# Patient Record
Sex: FEMALE | Race: OTHER | HISPANIC OR LATINO | URBAN - METROPOLITAN AREA
[De-identification: names, ages, dates, MRNs, and addresses within clinical notes are randomized per-mention and may not be internally consistent; named-entity substitution may affect disease eponyms.]

---

## 2018-08-11 ENCOUNTER — EMERGENCY (EMERGENCY)
Facility: HOSPITAL | Age: 83
LOS: 1 days | Discharge: HOME | End: 2018-08-11
Attending: EMERGENCY MEDICINE | Admitting: EMERGENCY MEDICINE
Payer: SELF-PAY

## 2018-08-11 VITALS
HEART RATE: 77 BPM | SYSTOLIC BLOOD PRESSURE: 210 MMHG | OXYGEN SATURATION: 98 % | RESPIRATION RATE: 18 BRPM | TEMPERATURE: 98 F | DIASTOLIC BLOOD PRESSURE: 121 MMHG

## 2018-08-11 VITALS
DIASTOLIC BLOOD PRESSURE: 113 MMHG | HEART RATE: 75 BPM | SYSTOLIC BLOOD PRESSURE: 208 MMHG | RESPIRATION RATE: 16 BRPM | OXYGEN SATURATION: 98 %

## 2018-08-11 DIAGNOSIS — K56.2 VOLVULUS: Chronic | ICD-10-CM

## 2018-08-11 DIAGNOSIS — M25.532 PAIN IN LEFT WRIST: ICD-10-CM

## 2018-08-11 PROCEDURE — 99284 EMERGENCY DEPT VISIT MOD MDM: CPT | Mod: 25

## 2018-08-11 PROCEDURE — 99285 EMERGENCY DEPT VISIT HI MDM: CPT | Mod: 25

## 2018-08-11 PROCEDURE — 25605 CLTX DST RDL FX/EPHYS SEP W/: CPT | Mod: LT

## 2018-08-11 PROCEDURE — 73090 X-RAY EXAM OF FOREARM: CPT

## 2018-08-11 PROCEDURE — 73110 X-RAY EXAM OF WRIST: CPT | Mod: 26,LT

## 2018-08-11 PROCEDURE — 73090 X-RAY EXAM OF FOREARM: CPT | Mod: 26,LT

## 2018-08-11 PROCEDURE — 73110 X-RAY EXAM OF WRIST: CPT

## 2018-08-11 PROCEDURE — 25605 CLTX DST RDL FX/EPHYS SEP W/: CPT | Mod: 54

## 2018-08-11 RX ORDER — ACETAMINOPHEN 500 MG
975 TABLET ORAL ONCE
Qty: 0 | Refills: 0 | Status: COMPLETED | OUTPATIENT
Start: 2018-08-11 | End: 2018-08-11

## 2018-08-11 RX ORDER — LOSARTAN POTASSIUM 100 MG/1
1 TABLET, FILM COATED ORAL
Qty: 0 | Refills: 0 | COMMUNITY

## 2018-08-11 RX ORDER — LOSARTAN POTASSIUM 100 MG/1
25 TABLET, FILM COATED ORAL DAILY
Qty: 0 | Refills: 0 | Status: DISCONTINUED | OUTPATIENT
Start: 2018-08-11 | End: 2018-08-15

## 2018-08-11 RX ADMIN — LOSARTAN POTASSIUM 25 MILLIGRAM(S): 100 TABLET, FILM COATED ORAL at 16:53

## 2018-08-11 RX ADMIN — Medication 975 MILLIGRAM(S): at 16:52

## 2018-08-11 NOTE — ED ADULT NURSE NOTE - NSIMPLEMENTINTERV_GEN_ALL_ED
Implemented All Fall with Harm Risk Interventions:  [place] to call system. Call bell, personal items and telephone within reach. Instruct patient to call for assistance. Room bathroom lighting operational. Non-slip footwear when patient is off stretcher. Physically safe environment: no spills, clutter or unnecessary equipment. Stretcher in lowest position, wheels locked, appropriate side rails in place. Provide visual cue, wrist band, yellow gown, etc. Monitor gait and stability. Monitor for mental status changes and reorient to person, place, and time. Review medications for side effects contributing to fall risk. Reinforce activity limits and safety measures with patient and family. Provide visual clues: red socks.

## 2018-08-11 NOTE — ED PROVIDER NOTE - OBJECTIVE STATEMENT
pt s/p slip and fall (wet feet on smooth surface) with fall onto outstretched hand. Patient with c/o left wrist pain (she is right hand dominant). No head injury or LOC. C/O swelling and pain.

## 2018-08-11 NOTE — ED PROVIDER NOTE - MUSCULOSKELETAL MINIMAL EXAM
tender at the distal radius / ulna. +snuff box tenderness. Dista neurovasc intact with +cap refill./RANGE OF MOTION LIMITED

## 2018-08-11 NOTE — ED PROVIDER NOTE - PROGRESS NOTE DETAILS
d/w patietn that BP is elevated. No chest pain. No hematuria. Patient will f/u outpt ; has losartan at home. Pain control. Fx reduced. Given strict precautions for return.

## 2018-08-11 NOTE — ED ADULT NURSE NOTE - CHPI ED NUR SYMPTOMS NEG
no difficulty bearing weight/no abrasion/no weakness/no bruising/no numbness/no back pain/no stiffness/no fever/no tingling

## 2018-08-13 ENCOUNTER — OUTPATIENT (OUTPATIENT)
Dept: OUTPATIENT SERVICES | Facility: HOSPITAL | Age: 83
LOS: 1 days | End: 2018-08-13
Payer: SELF-PAY

## 2018-08-13 ENCOUNTER — APPOINTMENT (OUTPATIENT)
Age: 83
End: 2018-08-13

## 2018-08-13 VITALS
TEMPERATURE: 98.9 F | BODY MASS INDEX: 18.05 KG/M2 | HEART RATE: 85 BPM | SYSTOLIC BLOOD PRESSURE: 194 MMHG | WEIGHT: 86 LBS | DIASTOLIC BLOOD PRESSURE: 101 MMHG | RESPIRATION RATE: 16 BRPM | HEIGHT: 58 IN | OXYGEN SATURATION: 98 %

## 2018-08-13 DIAGNOSIS — M19.041 PRIMARY OSTEOARTHRITIS, RIGHT HAND: ICD-10-CM

## 2018-08-13 DIAGNOSIS — I10 ESSENTIAL (PRIMARY) HYPERTENSION: ICD-10-CM

## 2018-08-13 DIAGNOSIS — Z78.9 OTHER SPECIFIED HEALTH STATUS: ICD-10-CM

## 2018-08-13 DIAGNOSIS — K56.2 VOLVULUS: Chronic | ICD-10-CM
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
## 2018-08-13 DIAGNOSIS — S52.552K: ICD-10-CM

## 2018-08-13 DIAGNOSIS — Z00.00 ENCOUNTER FOR GENERAL ADULT MEDICAL EXAMINATION WITHOUT ABNORMAL FINDINGS: ICD-10-CM

## 2018-08-13 DIAGNOSIS — M19.042 PRIMARY OSTEOARTHRITIS, RIGHT HAND: ICD-10-CM

## 2018-08-13 DIAGNOSIS — K56.2 VOLVULUS: ICD-10-CM

## 2018-08-13 PROCEDURE — 80048 BASIC METABOLIC PNL TOTAL CA: CPT

## 2018-08-13 PROCEDURE — G0463: CPT

## 2018-08-13 PROCEDURE — 83036 HEMOGLOBIN GLYCOSYLATED A1C: CPT

## 2018-08-13 PROCEDURE — 80061 LIPID PANEL: CPT

## 2018-08-13 RX ORDER — LOSARTAN POTASSIUM 100 MG/1
100 TABLET, FILM COATED ORAL DAILY
Qty: 60 | Refills: 0 | Status: ACTIVE | COMMUNITY
Start: 2018-08-13 | End: 1900-01-01

## 2018-08-13 NOTE — HISTORY OF PRESENT ILLNESS
[FreeTextEntry8] :  ID [id number]\par Patient was doing laundry on the terrace at her home. Slipped and fell onto an outstretched hand (left). She is right hand dominant. Severe pain in the wrist developed immediately post-fall, went to ED directly after. Found to have a left distal radius fracture, which was reduced and splinted. Told to follow-up with PMD regarding hypertension. Was given tylenol for pain and was told to follow-up with orthopedics. She has not had any falls in the past. Daughter reports patient is independent functionally and has no balance or mobility issues. Lives with her daughter. Pain is gradually improving, worst is 5/10 severity. Worse with wrist movement. \par \par Patient with high blood pressure always. Patient takes losartan 25mg BID. However it has been poorly on this regimen. She is compliant with her medications. Does not take any other medications.

## 2018-08-13 NOTE — HEALTH RISK ASSESSMENT
[Any fall with injury in past year] : Patient reported fall with injury in the past year [0] : 2) Feeling down, depressed, or hopeless: Not at all (0) [] : No

## 2018-08-13 NOTE — PHYSICAL EXAM
[No Acute Distress] : no acute distress [Well Nourished] : well nourished [Normal Sclera/Conjunctiva] : normal sclera/conjunctiva [EOMI] : extraocular movements intact [Normal Outer Ear/Nose] : the outer ears and nose were normal in appearance [No JVD] : no jugular venous distention [No Respiratory Distress] : no respiratory distress  [Clear to Auscultation] : lungs were clear to auscultation bilaterally [No Accessory Muscle Use] : no accessory muscle use [Normal Rate] : normal rate  [Regular Rhythm] : with a regular rhythm [Normal S1, S2] : normal S1 and S2 [No Murmur] : no murmur heard [Pedal Pulses Present] : the pedal pulses are present [No Edema] : there was no peripheral edema [Soft] : abdomen soft [Non Tender] : non-tender [Non-distended] : non-distended [No Masses] : no abdominal mass palpated [No HSM] : no HSM [Normal Bowel Sounds] : normal bowel sounds [No Rash] : no rash [Normal Gait] : normal gait [Coordination Grossly Intact] : coordination grossly intact [No Focal Deficits] : no focal deficits [Normal Affect] : the affect was normal [Normal Insight/Judgement] : insight and judgment were intact [de-identified] : Left wrist with splint and ace bandage. Good cap refill in fingers, normal ROM fingers, without sensory deficits. Right upper, and bilateral lower extremities without deficit. 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

## 2018-08-13 NOTE — ASSESSMENT
[FreeTextEntry1] : #Distal radial fracture s/p reduction and splinting, distal ulnar styloid fracture\par -Orthopedic referral\par -Physical therapy referral\par -RTC 2 weeks\par -Continue tylenol as needed for pain\par \par #Hypertension with urgency (chronically uncontrolled)\par -Likely elevated secondary to pain\par -No signs to suggest hypertensive emergency\par -Increase losartan to 100mg QD\par -RTC 2 weeks for follow-up BP\par

## 2018-08-14 ENCOUNTER — APPOINTMENT (OUTPATIENT)
Dept: ORTHOPEDIC SURGERY | Facility: HOSPITAL | Age: 83
End: 2018-08-14

## 2018-08-14 ENCOUNTER — OUTPATIENT (OUTPATIENT)
Dept: OUTPATIENT SERVICES | Facility: HOSPITAL | Age: 83
LOS: 1 days | End: 2018-08-14
Payer: SELF-PAY

## 2018-08-14 VITALS
HEIGHT: 58 IN | HEART RATE: 77 BPM | BODY MASS INDEX: 17.42 KG/M2 | OXYGEN SATURATION: 98 % | DIASTOLIC BLOOD PRESSURE: 101 MMHG | RESPIRATION RATE: 14 BRPM | SYSTOLIC BLOOD PRESSURE: 189 MMHG | TEMPERATURE: 98.8 F | WEIGHT: 83 LBS

## 2018-08-14 DIAGNOSIS — I10 ESSENTIAL (PRIMARY) HYPERTENSION: ICD-10-CM
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
## 2018-08-14 DIAGNOSIS — Z00.00 ENCOUNTER FOR GENERAL ADULT MEDICAL EXAMINATION WITHOUT ABNORMAL FINDINGS: ICD-10-CM

## 2018-08-14 DIAGNOSIS — S52.552K: ICD-10-CM

## 2018-08-14 DIAGNOSIS — K56.2 VOLVULUS: Chronic | ICD-10-CM

## 2018-08-14 PROCEDURE — G0463: CPT

## 2018-08-15 DIAGNOSIS — S52.552K: ICD-10-CM
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
## 2018-08-15 DIAGNOSIS — I10 ESSENTIAL (PRIMARY) HYPERTENSION: ICD-10-CM

## 2018-08-20 LAB
ANION GAP SERPL CALC-SCNC: 15 MMOL/L
BASOPHILS # BLD AUTO: 0.03 K/UL
BASOPHILS NFR BLD AUTO: 0.5 %
BUN SERPL-MCNC: 18 MG/DL
CALCIUM SERPL-MCNC: 9.2 MG/DL
CHLORIDE SERPL-SCNC: 104 MMOL/L
CHOLEST SERPL-MCNC: 166 MG/DL
CHOLEST/HDLC SERPL: 2.4 RATIO
CO2 SERPL-SCNC: 26 MMOL/L
CREAT SERPL-MCNC: 0.69 MG/DL
EOSINOPHIL # BLD AUTO: 0.14 K/UL
EOSINOPHIL NFR BLD AUTO: 2.2 %
GLUCOSE SERPL-MCNC: 72 MG/DL
HBA1C MFR BLD HPLC: 5.4 %
HCT VFR BLD CALC: 33.1 %
HDLC SERPL-MCNC: 69 MG/DL
HGB BLD-MCNC: 10.2 G/DL
IMM GRANULOCYTES NFR BLD AUTO: 0.2 %
LDLC SERPL CALC-MCNC: 82 MG/DL
LYMPHOCYTES # BLD AUTO: 1.43 K/UL
LYMPHOCYTES NFR BLD AUTO: 22.2 %
MAN DIFF?: NORMAL
MCHC RBC-ENTMCNC: 28.6 PG
MCHC RBC-ENTMCNC: 30.8 GM/DL
MCV RBC AUTO: 92.7 FL
MONOCYTES # BLD AUTO: 0.48 K/UL
MONOCYTES NFR BLD AUTO: 7.4 %
NEUTROPHILS # BLD AUTO: 4.36 K/UL
NEUTROPHILS NFR BLD AUTO: 67.5 %
PLATELET # BLD AUTO: 214 K/UL
POTASSIUM SERPL-SCNC: 4 MMOL/L
RBC # BLD: 3.57 M/UL
RBC # FLD: 14.2 %
SODIUM SERPL-SCNC: 145 MMOL/L
TRIGL SERPL-MCNC: 75 MG/DL
WBC # FLD AUTO: 6.45 K/UL

## 2018-09-05 ENCOUNTER — APPOINTMENT (OUTPATIENT)
Dept: FAMILY MEDICINE | Facility: HOSPITAL | Age: 83
End: 2018-09-05

## 2019-07-19 NOTE — ED ADULT NURSE NOTE - NSFALLRSKOUTCOME_ED_ALL_ED
"""Follow ERM w/o surgery. Call if vision decreases or distortion increases. Recommend regular Amsler checks.  """ Fall with Harm Risk

## 2022-11-30 NOTE — ED ADULT NURSE NOTE - NS ED NURSE RECORD ANOTHER VITAL SIGN
Yes Partial Purse String (Intermediate) Text: Given the location of the defect and the characteristics of the surrounding skin an intermediate purse string closure was deemed most appropriate.  Undermining was performed circumferentially around the surgical defect.  A purse string suture was then placed and tightened. Wound tension of the circular defect prevented complete closure of the wound.

## 2024-12-29 NOTE — ED ADULT NURSE NOTE - ISOLATION TYPE:
Alternate ibuprofen and Tylenol as directed, as needed for mild to moderate pain  Lortab as directed, as needed for severe pain (be careful as this medication may cause drowsiness, do not take with other sedating substances, you may also consider a stool softener and/or MiraLAX as medication may cause constipation)  Antibiotic eyedrops/ointment  Follow-up with ophthalmology      Thank You!    It was a pleasure taking care of you in our Emergency Department today. We know that when you come to [facility], you are entrusting us with your health, comfort, and safety. Our clinicians honor that trust, and truly appreciate the opportunity to care for you and your loved ones.    If you receive a survey about your Emergency Department experience today, please fill it out.  We value your feedback. Thank you.      Senait Jade PA-C    ___________________________________  I have included a copy of your lab results and/or radiologic studies from today's visit so you can have them easily available at your follow-up visit.   No results found for this or any previous visit (from the past 12 hour(s)).    No orders to display     [unfilled]     None